# Patient Record
Sex: FEMALE | Race: WHITE | Employment: FULL TIME
[De-identification: names, ages, dates, MRNs, and addresses within clinical notes are randomized per-mention and may not be internally consistent; named-entity substitution may affect disease eponyms.]

---

## 2019-01-01 ENCOUNTER — EXTERNAL RECORD (OUTPATIENT)
Dept: HEALTH INFORMATION MANAGEMENT | Facility: OTHER | Age: 29
End: 2019-01-01

## 2019-04-03 RX ORDER — VENLAFAXINE 37.5 MG/1
TABLET ORAL
COMMUNITY
End: 2021-04-20 | Stop reason: CLARIF

## 2019-04-03 RX ORDER — ESCITALOPRAM OXALATE 10 MG/1
TABLET ORAL
COMMUNITY

## 2019-04-03 RX ORDER — ALPRAZOLAM 0.25 MG/1
TABLET ORAL
COMMUNITY
End: 2021-04-20 | Stop reason: CLARIF

## 2020-11-12 ENCOUNTER — HOSPITAL (OUTPATIENT)
Dept: OTHER | Age: 30
End: 2020-11-12
Attending: INTERNAL MEDICINE

## 2020-11-14 LAB
SARS-COV-2 RNA RESP QL NAA+PROBE: NOT DETECTED
SPECIMEN SOURCE: NORMAL

## 2020-11-18 ENCOUNTER — EMPLOYEE HEALTH (OUTPATIENT)
Dept: OTHER | Age: 30
End: 2020-11-18

## 2020-11-19 ENCOUNTER — EMPLOYEE HEALTH (OUTPATIENT)
Dept: OTHER | Age: 30
End: 2020-11-19

## 2021-04-20 ENCOUNTER — WALK IN (OUTPATIENT)
Dept: URGENT CARE | Age: 31
End: 2021-04-20
Attending: INTERNAL MEDICINE

## 2021-04-20 DIAGNOSIS — R10.9 ABDOMINAL PAIN, UNSPECIFIED ABDOMINAL LOCATION: Primary | ICD-10-CM

## 2021-04-20 DIAGNOSIS — R19.7 DIARRHEA, UNSPECIFIED TYPE: ICD-10-CM

## 2021-04-20 LAB
APPEARANCE, POC: CLEAR
B-HCG UR QL: NEGATIVE
BILIRUBIN, POC: NEGATIVE
COLOR, POC: YELLOW
GLUCOSE UR-MCNC: NEGATIVE MG/DL
INTERNAL PROCEDURAL CONTROLS ACCEPTABLE: NO
KETONES, POC: NEGATIVE MG/DL
NITRITE, POC: NEGATIVE
OCCULT BLOOD, POC: NEGATIVE
PH UR: 8.5 [PH] (ref 5–7)
PROT UR-MCNC: NEGATIVE MG/DL
SP GR UR: 1.02 (ref 1–1.03)
UROBILINOGEN UR-MCNC: 0.2 MG/DL (ref 0–1)
WBC (LEUKOCYTE) ESTERASE, POC: NEGATIVE

## 2021-04-20 PROCEDURE — 81003 URINALYSIS AUTO W/O SCOPE: CPT | Performed by: NURSE PRACTITIONER

## 2021-04-20 PROCEDURE — 99213 OFFICE O/P EST LOW 20 MIN: CPT

## 2021-04-20 PROCEDURE — 81025 URINE PREGNANCY TEST: CPT | Performed by: NURSE PRACTITIONER

## 2021-04-20 RX ORDER — CETIRIZINE HYDROCHLORIDE 5 MG/1
5 TABLET ORAL DAILY
COMMUNITY

## 2021-04-20 RX ORDER — CLONAZEPAM 1 MG/1
1 TABLET ORAL 2 TIMES DAILY PRN
COMMUNITY

## 2021-04-20 ASSESSMENT — ENCOUNTER SYMPTOMS
EYE DISCHARGE: 0
HEMATOLOGIC/LYMPHATIC NEGATIVE: 1
ENDOCRINE NEGATIVE: 1
ABDOMINAL PAIN: 1
CHILLS: 0
NAUSEA: 1
FEVER: 0
DIARRHEA: 1
SHORTNESS OF BREATH: 0
WEAKNESS: 0
BLOOD IN STOOL: 0
DIZZINESS: 0
CHEST TIGHTNESS: 0
VOMITING: 0
PSYCHIATRIC NEGATIVE: 1

## 2021-04-20 ASSESSMENT — PAIN SCALES - GENERAL: PAINLEVEL: 3-4

## 2021-04-20 ASSESSMENT — VISUAL ACUITY: OU: 1

## 2021-04-21 ENCOUNTER — APPOINTMENT (OUTPATIENT)
Dept: LAB | Age: 31
End: 2021-04-21

## 2021-04-21 DIAGNOSIS — R19.7 DIARRHEA, UNSPECIFIED TYPE: Primary | ICD-10-CM

## 2021-04-21 LAB
C DIFF TOX B TCDB STL QL NAA+PROBE: NOT DETECTED
C JEJUNI+C COLI AG STL QL: NORMAL

## 2021-04-21 PROCEDURE — 87045 FECES CULTURE AEROBIC BACT: CPT

## 2021-04-21 PROCEDURE — 87449 NOS EACH ORGANISM AG IA: CPT

## 2021-04-21 PROCEDURE — 87177 OVA AND PARASITES SMEARS: CPT

## 2021-04-21 PROCEDURE — 87493 C DIFF AMPLIFIED PROBE: CPT

## 2021-04-21 PROCEDURE — 87427 SHIGA-LIKE TOXIN AG IA: CPT

## 2021-04-22 LAB
E COLI SHIGA-LIKE TOXIN 1+2 STL QL IA: NORMAL
O+P SPEC MICRO: NORMAL

## 2021-04-23 LAB — BACTERIA STL CULT: NORMAL

## 2021-05-26 VITALS
TEMPERATURE: 97.2 F | OXYGEN SATURATION: 97 % | HEIGHT: 68 IN | BODY MASS INDEX: 19.55 KG/M2 | DIASTOLIC BLOOD PRESSURE: 77 MMHG | RESPIRATION RATE: 16 BRPM | SYSTOLIC BLOOD PRESSURE: 110 MMHG | WEIGHT: 129 LBS | HEART RATE: 75 BPM

## 2023-06-07 ENCOUNTER — LAB ENCOUNTER (OUTPATIENT)
Dept: LAB | Age: 33
End: 2023-06-07
Payer: COMMERCIAL

## 2023-06-07 DIAGNOSIS — Z34.81 ENCOUNTER FOR SUPERVISION OF OTHER NORMAL PREGNANCY IN FIRST TRIMESTER: ICD-10-CM

## 2023-06-07 DIAGNOSIS — Z12.4 ROUTINE CERVICAL SMEAR: Primary | ICD-10-CM

## 2023-06-07 DIAGNOSIS — Z12.4 ENCOUNTER FOR PAPANICOLAOU SMEAR FOR CERVICAL CANCER SCREENING: ICD-10-CM

## 2023-06-07 LAB
ANTIBODY SCREEN: NEGATIVE
BASOPHILS # BLD AUTO: 0.06 X10(3) UL (ref 0–0.2)
BASOPHILS NFR BLD AUTO: 0.7 %
EOSINOPHIL # BLD AUTO: 0.27 X10(3) UL (ref 0–0.7)
EOSINOPHIL NFR BLD AUTO: 3.1 %
ERYTHROCYTE [DISTWIDTH] IN BLOOD BY AUTOMATED COUNT: 11.7 %
HBV SURFACE AG SER-ACNC: <0.1 [IU]/L
HBV SURFACE AG SERPL QL IA: NONREACTIVE
HCT VFR BLD AUTO: 41 %
HCV AB SERPL QL IA: NONREACTIVE
HGB BLD-MCNC: 13.9 G/DL
IMM GRANULOCYTES # BLD AUTO: 0.02 X10(3) UL (ref 0–1)
IMM GRANULOCYTES NFR BLD: 0.2 %
LYMPHOCYTES # BLD AUTO: 1.53 X10(3) UL (ref 1–4)
LYMPHOCYTES NFR BLD AUTO: 17.4 %
MCH RBC QN AUTO: 31 PG (ref 26–34)
MCHC RBC AUTO-ENTMCNC: 33.9 G/DL (ref 31–37)
MCV RBC AUTO: 91.5 FL
MONOCYTES # BLD AUTO: 0.71 X10(3) UL (ref 0.1–1)
MONOCYTES NFR BLD AUTO: 8.1 %
NEUTROPHILS # BLD AUTO: 6.22 X10 (3) UL (ref 1.5–7.7)
NEUTROPHILS # BLD AUTO: 6.22 X10(3) UL (ref 1.5–7.7)
NEUTROPHILS NFR BLD AUTO: 70.5 %
PLATELET # BLD AUTO: 215 10(3)UL (ref 150–450)
RBC # BLD AUTO: 4.48 X10(6)UL
RH BLOOD TYPE: POSITIVE
RUBV IGG SER QL: POSITIVE
RUBV IGG SER-ACNC: 375.8 IU/ML (ref 10–?)
T PALLIDUM AB SER QL IA: NONREACTIVE
WBC # BLD AUTO: 8.8 X10(3) UL (ref 4–11)

## 2023-06-07 PROCEDURE — 86900 BLOOD TYPING SEROLOGIC ABO: CPT

## 2023-06-07 PROCEDURE — 86780 TREPONEMA PALLIDUM: CPT

## 2023-06-07 PROCEDURE — 87389 HIV-1 AG W/HIV-1&-2 AB AG IA: CPT

## 2023-06-07 PROCEDURE — 87591 N.GONORRHOEAE DNA AMP PROB: CPT

## 2023-06-07 PROCEDURE — 86850 RBC ANTIBODY SCREEN: CPT

## 2023-06-07 PROCEDURE — 36415 COLL VENOUS BLD VENIPUNCTURE: CPT

## 2023-06-07 PROCEDURE — 87086 URINE CULTURE/COLONY COUNT: CPT

## 2023-06-07 PROCEDURE — 86901 BLOOD TYPING SEROLOGIC RH(D): CPT

## 2023-06-07 PROCEDURE — 87491 CHLMYD TRACH DNA AMP PROBE: CPT

## 2023-06-07 PROCEDURE — 85025 COMPLETE CBC W/AUTO DIFF WBC: CPT

## 2023-06-07 PROCEDURE — 86803 HEPATITIS C AB TEST: CPT

## 2023-06-07 PROCEDURE — 86762 RUBELLA ANTIBODY: CPT

## 2023-06-07 PROCEDURE — 87340 HEPATITIS B SURFACE AG IA: CPT

## 2023-09-20 ENCOUNTER — LAB ENCOUNTER (OUTPATIENT)
Dept: LAB | Age: 33
End: 2023-09-20
Attending: OBSTETRICS & GYNECOLOGY
Payer: COMMERCIAL

## 2023-09-20 DIAGNOSIS — O09.522 ENCOUNTER FOR SUPERVISION OF MULTIGRAVIDA OF ADVANCED MATERNAL AGE IN SECOND TRIMESTER: ICD-10-CM

## 2023-09-20 LAB
BASOPHILS # BLD AUTO: 0.04 X10(3) UL (ref 0–0.2)
BASOPHILS NFR BLD AUTO: 0.4 %
EOSINOPHIL # BLD AUTO: 0.05 X10(3) UL (ref 0–0.7)
EOSINOPHIL NFR BLD AUTO: 0.5 %
ERYTHROCYTE [DISTWIDTH] IN BLOOD BY AUTOMATED COUNT: 12 %
GLUCOSE 1H P GLC SERPL-MCNC: 124 MG/DL
HCT VFR BLD AUTO: 36.7 %
HGB BLD-MCNC: 12.6 G/DL
IMM GRANULOCYTES # BLD AUTO: 0.07 X10(3) UL (ref 0–1)
IMM GRANULOCYTES NFR BLD: 0.7 %
LYMPHOCYTES # BLD AUTO: 1.37 X10(3) UL (ref 1–4)
LYMPHOCYTES NFR BLD AUTO: 14.4 %
MCH RBC QN AUTO: 31.3 PG (ref 26–34)
MCHC RBC AUTO-ENTMCNC: 34.3 G/DL (ref 31–37)
MCV RBC AUTO: 91.3 FL
MONOCYTES # BLD AUTO: 0.73 X10(3) UL (ref 0.1–1)
MONOCYTES NFR BLD AUTO: 7.7 %
NEUTROPHILS # BLD AUTO: 7.26 X10 (3) UL (ref 1.5–7.7)
NEUTROPHILS # BLD AUTO: 7.26 X10(3) UL (ref 1.5–7.7)
NEUTROPHILS NFR BLD AUTO: 76.3 %
PLATELET # BLD AUTO: 211 10(3)UL (ref 150–450)
RBC # BLD AUTO: 4.02 X10(6)UL
WBC # BLD AUTO: 9.5 X10(3) UL (ref 4–11)

## 2023-09-20 PROCEDURE — 36415 COLL VENOUS BLD VENIPUNCTURE: CPT

## 2023-09-20 PROCEDURE — 85025 COMPLETE CBC W/AUTO DIFF WBC: CPT

## 2023-09-20 PROCEDURE — 82950 GLUCOSE TEST: CPT

## 2023-12-18 NOTE — PROGRESS NOTES
Pt  [facility] 21 presents to L&D for scheduled PC/S. Pt denies ucs, LOF, Vag bleeding, states + fetal movement. EFM tested and applied. Consents explained and signed. SCDs placed.

## 2023-12-18 NOTE — PLAN OF CARE
Problem: SKIN/TISSUE INTEGRITY - ADULT  Goal: Skin integrity remains intact  Description: INTERVENTIONS  - Assess and document risk factors for pressure ulcer development  - Assess and document skin integrity  - Monitor for areas of redness and/or skin breakdown  - Initiate interventions, skin care algorithm/standards of care as needed  Outcome: Progressing  Goal: Incision(s), wounds(s) or drain site(s) healing without S/S of infection  Description: INTERVENTIONS:  - Assess and document risk factors for pressure ulcer development  - Assess and document skin integrity  - Assess and document dressing/incision, wound bed, drain sites and surrounding tissue  - Implement wound care per orders  - Initiate isolation precautions as appropriate  - Initiate Pressure Ulcer prevention bundle as indicated  Outcome: Progressing  Goal: Oral mucous membranes remain intact  Description: INTERVENTIONS  - Assess oral mucosa and hygiene practices  - Implement preventative oral hygiene regimen  - Implement oral medicated treatments as ordered  Outcome: Progressing     Problem: POSTPARTUM  Goal: Long Term Goal:Experiences normal postpartum course  Description: INTERVENTIONS:  - Assess and monitor vital signs and lab values. - Assess fundus and lochia. - Provide ice/sitz baths for perineum discomfort. - Monitor healing of incision/episiotomy/laceration, and assess for signs and symptoms of infection and hematoma. - Assess bladder function and monitor for bladder distention.  - Provide/instruct/assist with pericare as needed. - Provide VTE prophylaxis as needed. - Monitor bowel function.  - Encourage ambulation and provide assistance as needed. - Assess and monitor emotional status and provide social service/psych resources as needed. - Utilize standard precautions and use personal protective equipment as indicated.  Ensure aseptic care of all intravenous lines and invasive tubes/drains.  - Obtain immunization and exposure to communicable diseases history. Outcome: Progressing  Goal: Optimize infant feeding at the breast  Description: INTERVENTIONS:  - Initiate breast feeding within first hour after birth. - Monitor effectiveness of current breast feeding efforts. - Assess support systems available to mother/family.  - Identify cultural beliefs/practices regarding lactation, letdown techniques, maternal food preferences. - Assess mother's knowledge and previous experience with breast feeding.  - Provide information as needed about early infant feeding cues (e.g., rooting, lip smacking, sucking fingers/hand) versus late cue of crying.  - Discuss/demonstrate breast feeding aids (e.g., infant sling, nursing footstool/pillows, and breast pumps). - Encourage mother/other family members to express feelings/concerns, and actively listen. - Educate father/SO about benefits of breast feeding and how to manage common lactation challenges. - Recommend avoidance of specific medications or substances incompatible with breast feeding.  - Assess and monitor for signs of nipple pain/trauma. - Instruct and provide assistance with proper latch. - Review techniques for milk expression (breast pumping) and storage of breast milk. Provide pumping equipment/supplies, instructions and assistance, as needed. - Encourage rooming-in and breast feeding on demand.  - Encourage skin-to-skin contact. - Provide LC support as needed. - Assess for and manage engorgement. - Provide breast feeding education handouts and information on community breast feeding support. Outcome: Progressing  Goal: Appropriate maternal -  bonding  Description: INTERVENTIONS:  - Assess caregiver- interactions. - Assess caregiver's emotional status and coping mechanisms. - Encourage caregiver to participate in  daily care. - Assess support systems available to mother/family.  - Provide /case management support as needed.   Outcome: Progressing

## 2023-12-18 NOTE — ANESTHESIA PROCEDURE NOTES
Spinal Block    Performed by: Trish Maria MD  Authorized by: Trish Maria MD      General Information and Staff    Start Time:   Anesthesiologist:  Trish Maria MD  Performed by: Anesthesiologist  Patient Location:  OB  Site identification: surface landmarks    Reason for Block: at surgeon's request and surgical anesthesia    Preanesthetic Checklist: patient identified, IV checked, risks and benefits discussed, monitors and equipment checked, pre-op evaluation, timeout performed, anesthesia consent and sterile technique used      Procedure Details    Patient Position:  Sitting  Prep: ChloraPrep    Monitoring:  Cardiac monitor, heart rate and continuous pulse ox  Approach:  Midline  Location:  L3-4  Injection Technique:  Single-shot    Needle    Needle Type:  Sprotte  Needle Gauge:  24 G  Needle Length:  3.5 in    Assessment    Sensory Level:   Events: clear CSF, CSF aspirated, well tolerated and blood negative      Additional Comments     First attempt successful. No resistance, pain or parasthesias on injection. Aspiration of clear CSF both at start of injection and at end.

## 2023-12-18 NOTE — PLAN OF CARE
Problem: BIRTH - VAGINAL/ SECTION  Goal: Fetal and maternal status remain reassuring during the birth process  Description: INTERVENTIONS:  - Monitor vital signs  - Monitor fetal heart rate  - Monitor uterine activity  - Monitor labor progression (vaginal delivery)  - DVT prophylaxis (C/S delivery)  - Surgical antibiotic prophylaxis (C/S delivery)  2023 0758 by Alexia Ayala RN  Outcome: Completed  2023 0708 by Alexia Ayala RN  Outcome: Progressing

## 2023-12-18 NOTE — PROGRESS NOTES
Pt is a 26year old female admitted to TR/TR-A. Chief Complaint   Patient presents with    Scheduled       Pt is  39w1d intra-uterine pregnancy. History obtained, consents signed. Oriented to room, staff, and plan of care.

## 2023-12-18 NOTE — PROGRESS NOTES
Pt transferred to MB unit per LIP order. Pt transferred via cart, in stable condition, with all personal belongings and accompanied by FOB and holding Friend [employer]. Report given to KYM REAGAN.

## 2023-12-18 NOTE — PROGRESS NOTES
Admitted to mother/baby per cart. Oriented to room. Safety precautions initiated. Bed in low position. Call light within reach. IV infusing on pump, Calle to gravity, SCD's being applied.

## 2023-12-19 NOTE — PLAN OF CARE
Problem: POSTPARTUM  Goal: Optimize infant feeding at the breast  Description: INTERVENTIONS:  - Initiate breast feeding within first hour after birth. - Monitor effectiveness of current breast feeding efforts. - Assess support systems available to mother/family.  - Identify cultural beliefs/practices regarding lactation, letdown techniques, maternal food preferences. - Assess mother's knowledge and previous experience with breast feeding.  - Provide information as needed about early infant feeding cues (e.g., rooting, lip smacking, sucking fingers/hand) versus late cue of crying.  - Discuss/demonstrate breast feeding aids (e.g., infant sling, nursing footstool/pillows, and breast pumps). - Encourage mother/other family members to express feelings/concerns, and actively listen. - Educate father/SO about benefits of breast feeding and how to manage common lactation challenges. - Recommend avoidance of specific medications or substances incompatible with breast feeding.  - Assess and monitor for signs of nipple pain/trauma. - Instruct and provide assistance with proper latch. - Review techniques for milk expression (breast pumping) and storage of breast milk. Provide pumping equipment/supplies, instructions and assistance, as needed. - Encourage rooming-in and breast feeding on demand.  - Encourage skin-to-skin contact. - Provide LC support as needed. - Assess for and manage engorgement. - Provide breast feeding education handouts and information on community breast feeding support. Outcome: Progressing  Goal: Appropriate maternal -  bonding  Description: INTERVENTIONS:  - Assess caregiver- interactions. - Assess caregiver's emotional status and coping mechanisms. - Encourage caregiver to participate in  daily care. - Assess support systems available to mother/family.  - Provide /case management support as needed.   Outcome: Progressing

## 2023-12-20 NOTE — PLAN OF CARE
Problem: SKIN/TISSUE INTEGRITY - ADULT  Goal: Skin integrity remains intact  Description: INTERVENTIONS  - Assess and document risk factors for pressure ulcer development  - Assess and document skin integrity  - Monitor for areas of redness and/or skin breakdown  - Initiate interventions, skin care algorithm/standards of care as needed  Outcome: Completed     Problem: POSTPARTUM  Goal: Optimize infant feeding at the breast  Description: INTERVENTIONS:  - Initiate breast feeding within first hour after birth. - Monitor effectiveness of current breast feeding efforts. - Assess support systems available to mother/family.  - Identify cultural beliefs/practices regarding lactation, letdown techniques, maternal food preferences. - Assess mother's knowledge and previous experience with breast feeding.  - Provide information as needed about early infant feeding cues (e.g., rooting, lip smacking, sucking fingers/hand) versus late cue of crying.  - Discuss/demonstrate breast feeding aids (e.g., infant sling, nursing footstool/pillows, and breast pumps). - Encourage mother/other family members to express feelings/concerns, and actively listen. - Educate father/SO about benefits of breast feeding and how to manage common lactation challenges. - Recommend avoidance of specific medications or substances incompatible with breast feeding.  - Assess and monitor for signs of nipple pain/trauma. - Instruct and provide assistance with proper latch. - Review techniques for milk expression (breast pumping) and storage of breast milk. Provide pumping equipment/supplies, instructions and assistance, as needed. - Encourage rooming-in and breast feeding on demand.  - Encourage skin-to-skin contact. - Provide LC support as needed. - Assess for and manage engorgement. - Provide breast feeding education handouts and information on community breast feeding support.    Outcome: Completed  Goal: Appropriate maternal -  bonding  Description: INTERVENTIONS:  - Assess caregiver- interactions. - Assess caregiver's emotional status and coping mechanisms. - Encourage caregiver to participate in  daily care. - Assess support systems available to mother/family.  - Provide /case management support as needed.   Outcome: Completed

## 2023-12-20 NOTE — DISCHARGE INSTRUCTIONS
PAIN:  Ibuprofen 600 mg every 6 hours  12:00 and 6:00    Tylenol 500-1000 mg every 6 hours  (IF Norco 5 mg prescribed, DO NOT take together with Tylenol)  3:00 and 9:00    CONSTIPATION:  Stool Softener (Colace) Twice a day

## 2023-12-20 NOTE — DISCHARGE SUMMARY
[facility]  Discharge Summary    [address] Patient Status:  Inpatient    1990 MRN [id number]   [facility] [id number] Attending No att. providers found   [address] Day # 2 PCP No primary care provider on file. Admit Date:  2023    EDC: Estimated Date of Delivery: 23    Gestational Age: 36w3d    Antepartum complications: See Prenatal Record    Date of Delivery: See Delivery Summary    Delivered By:  See Delivery Summary    Delivery Type: Primary Low Transverse  section for Breech       Intrapartum Complications: See Delivery Summary    Episiotomy / lacerations: See Delivery Summary      Rh Immune Globulin Given:  See Prenatal Record and nursing notes    Rubella Vaccine Given: See Prenatal Record and nursing notes    Activity:  Routine post partum and post operative instructions if  section    Medications:  Motrin alternating with Tylenol, Norco and Gabapentin.      Diet:  General    Discharge Date: 2023          Plan:       Follow-up appointment in 2  weeks  Routine post partum instructions    Yue Cancino MD  2023  4:08 PM

## 2023-12-22 NOTE — PROGRESS NOTES
REV'D SELF AND INFANT CARE WITH MOM. PT C/O INCISIONAL PAIN NOT WELL-CONTROLLED WITH PAIN MEDS (TYLENOL, MOTRIN & GABAPENTIN). PT STATES SHE \"HAS A PRESCRIPTION FOR NORCO, BUT HAD A PANIC ATTACK WHEN SHE TOOK IT SO DOESN'T WANT TO TAKE IT AGAIN. \" PT ALSO REPORTS FEELING VERY EMOTIONAL & OVERWHELMED. PT WAS WEEPY DURING CALL. PT DENIES THOUGHTS OF HURTING HERSELF OR HER BABY. EMOTIONAL SUPPORT PROVIDED. CONTACT NUMBERS FOR MOM'S HELP LINE AND ARLEY FARAH GIVEN TO PT. PT'S  JOINED END OF CALL AND WAS ENCOURAGED TO MAKE SURE PT CALLS HER OB DOCTOR ABOUT INCISIONAL PAIN AND EMOTIONAL CONCERNS. PT &   VERBALIZE UNDERSTANDING OF INSTRUCTIONS AND AGREE TO CALL HER DOCTOR THIS MORNING.

## 2024-01-14 NOTE — ANESTHESIA PROCEDURE NOTES
Regional Block    Performed by: Roberto Garnica MD  Authorized by: Roberto Garnica MD      General Information and Staff    Start Time:   Anesthesiologist:  Roberto Garnica MD  Performed by: Anesthesiologist  Patient Location:  OR    Block Placement: Post Induction  Site Identification: real time ultrasound guided and image stored and retrievable    Block site/laterality marked before start: site marked  Reason for Block: at surgeon's request and post-op pain management    Preanesthetic Checklist: 2 patient identifers, IV checked, risks and benefits discussed, monitors and equipment checked, pre-op evaluation, timeout performed, anesthesia consent, sterile technique used, no prohibitive neurological deficits and no local skin infection at insertion site      Procedure Details    Patient Position:  Supine  Prep: ChloraPrep    Monitoring:  Cardiac monitor, continuous pulse ox and blood pressure cuff  Block Type:  TAP  Laterality:  Bilateral  Injection Technique:  Single-shot    Needle    Needle Type:  Short-bevel and echogenic  Needle Gauge:  21 G  Needle Length:  110 mm  Needle Localization:  Ultrasound guidance  Reason for Ultrasound Use: appropriate spread of the medication was noted in real time and no ultrasound evidence of intravascular and/or intraneural injection            Assessment    Injection Assessment:  Good spread noted, negative resistance, negative aspiration for heme, incremental injection, low pressure, local visualized surrounding nerve on ultrasound and no pain on injection  Heart Rate Change: No    - Patient tolerated block procedure well without evidence of immediate block related complications.      Medications      Additional Comments    Medication:  0.375 bupivacaine with 2mg decadron, 19cc's bilaterally show

## 2025-06-19 NOTE — PROGRESS NOTES
Gynecology Office Visit    The patient was offered a medical chaperone during the visit.    Danna Molina is a 34 year old female  Patient's last menstrual period was 2025. (contraception:  its ok if we get pregnant (Hx of needing IVF).     HPI:     Chief Complaint   Patient presents with    Er F/u     Went to ER for RLQ pain, had ultrasound and found Right ovarian cysts      Here for follow up after ER visit for abdominal pain. CT and US showed a likely 2.1 cm follicular cyst on the right ovary.     Reports that her pain started late  at around 9 pm. She states she was walking around her kitchen and she started to have bilateral suprapubic sharp shooting pain. She tried to tough it out overnight but ended up going into the ER the next morning at 3 am. See ED note 2025 below.    Today she states that her pain went away on the , resolving on its own. In office today she is complaining of bloating and mid discomfort.     [place]    ER note 25    Danna Molina is a 34 year old female in ED room 14B/14B. The patient presented with       Chief Complaint   Patient presents with    Abdominal Pain   .      The patient was handed off to me by Dr. Mello.      ED Course          ED Course as of 25 0838   Sat 2025   0612 Pt with lower abd pain. Pending US. CT neg for appendicitis. Will reassess after US.  [FS]   0733 Patient reassessed.  Having some increased pain in the lower abdomen following the ultrasound.  Ultrasound negative for acute process.  She does have a 2.1 cm cyst, likely a follicular cyst.  No evidence of appendicitis.  Patient's labs are concerning for leukocytosis but otherwise unremarkable.  Patient feels reassured.  Will administer additional fentanyl and Toradol and reassess. [FS]       ED Course User Index  [FS] Fer Guardado MD      Patient reassessed after additional dose of fentanyl and Toradol.  States that the pain is  significantly alleviated.  Discussed with the patient remaining in observation for continued monitoring and pain control versus discharged home.  Patient states she feels comfortable with discharge.  She will follow-up with her OB/GYN.  Advised to return with worsening pain or fever.       Chart and previous encounters reviewed.  HISTORY:  Past Medical History[1]   Past Surgical History[2]   Family History[3]   Social History: Short Social Hx on File[4]     Medications (Active prior to today's visit):  Current Medications[5]    Allergies:  Allergies[6]    Gyn:  Menarche: 13  Period Cycle (Days): 40  Period Duration (Days): 5 days  Use of Birth Control (if yes, specify type): None  Hx Prior Abnormal Pap: No  Pap Date: 23  Pap Result Notes: 23 neg; \"normal\" per pt   Follow Up Recommendation:     OB Hx:  OB History    Para Term  AB Living   1 1 1 0 0 1   SAB IAB Ectopic Multiple Live Births   0 0 0 0 1      # Outcome Date GA Lbr Gonzalo/2nd Weight Sex Type Anes PTL Lv   1 Term 23 39w1d  7 lb 15 oz (3.6 kg) M Caesarean Spinal N MICHAEL         ROS:     10 point ROS completed and was negative, except for pertinent positive and negatives stated in the HPI.    PHYSICAL EXAM:   /60   Ht 68\"   Wt 141 lb 12.8 oz (64.3 kg)   LMP 2025   Breastfeeding No   BMI 21.56 kg/m²      Wt Readings from Last 6 Encounters:   25 141 lb 12.8 oz (64.3 kg)   24 136 lb (61.7 kg)   23 157 lb (71.2 kg)   23 157 lb (71.2 kg)   23 157 lb (71.2 kg)   23 156 lb (70.8 kg)        Gen:  Oriented, in  acute distress      Transvaginal pelvic ultrasound 25     CLINICAL HISTORY: Right-sided pelvic pain.     TECHNIQUE: Transabdominal and transvaginal pelvic ultrasound was performed   and multiple images were submitted for review.     Doppler color, spectral waveform imaging of the ovaries was performed.     COMPARISON: CT abdomen pelvis with contrast from 2025.      FINDINGS:   Uterus: Measures 7.8 cm x 4.4 cm x 5.2 cm. The transvaginal images   endometrial stripe thickness measures up to approximately 9 mm. There is   fluid within the endocervical canal and lower uterine segment, likely blood   products. No gestational sac.     Right ovary: Measures 5.3 cm x 2.9 cm x 2.3 cm. Contains a simple cystic   lesion measuring 2.1 cm x 1.8 cm x 2.0 cm suggestive of a small cyst or   dominant follicle. Normal-appearing Doppler color and spectral flow.     Left ovary: Measures 2.5 cm x 2.3 cm x 2.0 cm. A few prominent follicles.   Normal-appearing Doppler color and spectral flow.     Other: No free fluid in the pelvic cul-de-sac.     IMPRESSION:   2.1 cm cyst simple right ovarian cystic lesion, likely a dominant follicle   or small ovarian cyst. No sonographic evidence of ovarian torsion.     Fluid within the endocervical canal, likely blood products.       ASSESSMENT/PLAN:     1. Mittelschmerz - resolved    2. Right ovarian cyst - resolving    3. Pelvic pain - resolved      Discussed if her pain returns  follow up with us  Symptoms diary  NSAID's        Meds This Visit:  Requested Prescriptions      No prescriptions requested or ordered in this encounter       Imaging & Referrals:  None     Return in about 3 months (around 9/19/2025) for Well Woman Exam.      Douglas Alcala MD  6/19/2025  1:09 PM         This note was created by [employer] voice recognition. Errors in content may be related to improper recognition by the system; efforts to review and correct have been done but errors may still exist. Please contact me with any questions.    Note to patient and family   The 21st Century Cures Act makes medical notes available to patients in the interest of transparency.  However, please be advised that this is a medical document.  It is intended as [id number] communication.  It is written and medical language may contain abbreviations or verbiage that are technical and  unfamiliar.  It may appear blunt or direct.  Medical documents are intended to carry relevant information, facts as evident, and the clinical opinion of the practitioner.           [1]   Past Medical History:   Infertility, female   [2]   Past Surgical History:  Procedure Laterality Date    Other surgical history  2007    T&A    Tonsillectomy     [3]   Family History  Problem Relation Age of Onset    Cancer Father     Alcohol abuse Mother     Depression Mother     Osteoporosis Mother    [4]   Social History  Socioeconomic History    Marital status:    Tobacco Use    Smoking status: Never    Smokeless tobacco: Never   Substance and Sexual Activity    Alcohol use: Not Currently    Drug use: Never    Sexual activity: Yes     Social Drivers of Health     Food Insecurity: No Food Insecurity (6/19/2025)    NCSS - Food Insecurity     Worried About Running Out of Food in the Last Year: No     Ran Out of Food in the Last Year: No   Transportation Needs: No Transportation Needs (6/19/2025)    NCSS - Transportation     Lack of Transportation: No   Stress: No Stress Concern Present (12/18/2023)    Stress     Feeling of Stress : No   Housing Stability: Not At Risk (6/19/2025)    NCSS - Housing/Utilities     Has Housing: Yes     Worried About Losing Housing: No     Unable to Get Utilities: No   [5]   Current Outpatient Medications   Medication Sig Dispense Refill    Norethindrone (ORTHO MICRONOR) 0.35 MG Oral Tab Take 1 tablet (0.35 mg total) by mouth daily. (Patient not taking: Reported on 6/19/2025) 84 tablet 3    HYDROcodone-acetaminophen 5-325 MG Oral Tab Take 1-2 tablets by mouth every 4 (four) hours as needed for Pain. (Patient not taking: Reported on 6/19/2025) 10 tablet 0    gabapentin 300 MG Oral Cap Take 1 capsule (300 mg total) by mouth nightly. (Patient not taking: Reported on 6/19/2025) 10 capsule 0   [6]   Allergies  Allergen Reactions    Morphine RASH     Rash, swelling, itchiness and hives